# Patient Record
Sex: FEMALE | Race: WHITE | NOT HISPANIC OR LATINO | Employment: STUDENT | URBAN - METROPOLITAN AREA
[De-identification: names, ages, dates, MRNs, and addresses within clinical notes are randomized per-mention and may not be internally consistent; named-entity substitution may affect disease eponyms.]

---

## 2018-09-27 ENCOUNTER — HOSPITAL ENCOUNTER (EMERGENCY)
Facility: CLINIC | Age: 18
Discharge: HOME | End: 2018-09-27
Attending: EMERGENCY MEDICINE | Admitting: EMERGENCY MEDICINE
Payer: COMMERCIAL

## 2018-09-27 ENCOUNTER — TRANSFERRED RECORDS (OUTPATIENT)
Dept: HEALTH INFORMATION MANAGEMENT | Facility: CLINIC | Age: 18
End: 2018-09-27

## 2018-09-27 VITALS
TEMPERATURE: 99.3 F | RESPIRATION RATE: 18 BRPM | BODY MASS INDEX: 23.49 KG/M2 | WEIGHT: 155 LBS | HEART RATE: 104 BPM | OXYGEN SATURATION: 97 % | DIASTOLIC BLOOD PRESSURE: 83 MMHG | SYSTOLIC BLOOD PRESSURE: 120 MMHG | HEIGHT: 68 IN

## 2018-09-27 DIAGNOSIS — J06.9 VIRAL UPPER RESPIRATORY ILLNESS: ICD-10-CM

## 2018-09-27 PROCEDURE — 99281 EMR DPT VST MAYX REQ PHY/QHP: CPT

## 2018-09-27 PROCEDURE — 99282 EMERGENCY DEPT VISIT SF MDM: CPT | Mod: Z6 | Performed by: EMERGENCY MEDICINE

## 2018-09-27 PROCEDURE — 25000125 ZZHC RX 250: Performed by: EMERGENCY MEDICINE

## 2018-09-27 RX ORDER — NORETHINDRONE ACETATE AND ETHINYL ESTRADIOL AND FERROUS FUMARATE 5-7-9-7
1 KIT ORAL DAILY
COMMUNITY

## 2018-09-27 RX ORDER — ALBUTEROL SULFATE 0.83 MG/ML
2.5 SOLUTION RESPIRATORY (INHALATION)
Status: DISCONTINUED | OUTPATIENT
Start: 2018-09-27 | End: 2018-09-27 | Stop reason: HOSPADM

## 2018-09-27 RX ADMIN — ALBUTEROL SULFATE 2.5 MG: 2.5 SOLUTION RESPIRATORY (INHALATION) at 12:51

## 2018-09-27 ASSESSMENT — ENCOUNTER SYMPTOMS
SHORTNESS OF BREATH: 1
COUGH: 1
SORE THROAT: 1
VOICE CHANGE: 1
FEVER: 0

## 2018-09-27 NOTE — ED NOTES
Bed: IN04  Expected date: 9/27/18  Expected time: 11:29 AM  Means of arrival:   Comments:  Mallory Ahrweiler: Coming from [place] with complaints of stridor.  Patient being sent for possible racemic epi.  Has received solumedrol.

## 2018-09-27 NOTE — ED PROVIDER NOTES
"  History     Chief Complaint   Patient presents with     Shortness of Breath     HPI  Natasha Ahrweiler is a 17 year old otherwise healthy female who presents for evaluation of shortness of breath. Patient complains over the past 4-5 days she has had cold-type symptoms including sore throat, loss of voice, headache, sinus congestion, and chest congestion. She presented to [facility] for evaluation today because she had shortness of breath and difficulty breathing, worse when walking to class. She was evaluated at [facility] and was treated with IM steroids and referred here to the ED for further evaluation and management.     I have reviewed the Medications, Allergies, Past Medical and Surgical History, and Social History in the [facility] system.  History reviewed. No pertinent past medical history.    History reviewed. No pertinent surgical history.    No family history on file.    Social History   Substance Use Topics     Smoking status: Never Smoker     Smokeless tobacco: Never Used     Alcohol use No       No current facility-administered medications for this encounter.      Current Outpatient Prescriptions   Medication     norethindrone-ethinyl estradiol-iron (ESTROSTEP FE) 1-20/1-30/1-35 MG-MCG per tablet        Allergies   Allergen Reactions     Penicillins Hives       Review of Systems   Constitutional: Negative for fever.   HENT: Positive for congestion, sore throat and voice change.    Respiratory: Positive for cough and shortness of breath.    All other systems reviewed and are negative.      Physical Exam   BP: (!) 137/91  Pulse: 104  Heart Rate: 99  Temp: 98.9  F (37.2  C)  Resp: 18  Height: 172.7 cm (5' 8\")  Weight: 70.3 kg (155 lb)  SpO2: 100 %      Physical Exam   Constitutional: She is oriented to person, place, and time. She appears well-developed and well-nourished. No distress.   HENT:   Mouth/Throat: Oropharynx is clear and moist.   Neck: Neck supple.   Cardiovascular: Normal rate, regular " rhythm and normal heart sounds.    Pulmonary/Chest: Effort normal and breath sounds normal. No stridor. She has no wheezes.   Neurological: She is alert and oriented to person, place, and time.   Skin: Skin is warm.   Psychiatric: She has a normal mood and affect. Her behavior is normal.   Nursing note and vitals reviewed.      ED Course     ED Course     Procedures       12:39 PM  The patient was seen and examined by Dr. De La Fuente in Room 4.     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  There she was given IM Solu-Medrol and referred here for concerns of airway.  Here she is afebrile, her lungs are clear, oxygen is 100%, she has no wheezing or stridor. I believe she is safe to be discharged home with diagnosis of viral syndrome.  Her oropharynx is clear without swelling.    I have reviewed the nursing notes.    I have reviewed the findings, diagnosis, plan and need for follow up with the patient.    Discharge Medication List as of 9/27/2018  1:52 PM          Final diagnoses:   Viral upper respiratory illness   I, Penny Echols, am serving as a trained medical scribe to document services personally performed by Davy De La Fuente MD, based on the provider's statements to me.   IDavy MD, was physically present and have reviewed and verified the accuracy of this note documented by Penny Echols.      9/27/2018   [facility], EMERGENCY DEPARTMENT     Umair De La Fuente MD  10/01/18 0751

## 2018-09-27 NOTE — ED AVS SNAPSHOT
[facility] [place], Emergency Department    [address] [place] [address]    Phone:  [phone]                                       Natasha Ahrweiler   MRN: [id number]    Department:  [facility], Emergency Department   Date of Visit:  9/27/2018           After Visit Summary Signature Page     I have received my discharge instructions, and my questions have been answered. I have discussed any challenges I see with this plan with the nurse or doctor.    ..........................................................................................................................................  Patient/Patient Representative Signature      ..........................................................................................................................................  Patient Representative Print Name and Relationship to Patient    ..................................................               ................................................  Date                                   Time    ..........................................................................................................................................  Reviewed by Signature/Title    ...................................................              ..............................................  Date                                               Time          22EPIC Rev 08/18

## 2018-09-27 NOTE — DISCHARGE INSTRUCTIONS
The steroids you were given in clinic will start working soon and improve your current symptoms.  You have a viral infection responsible for your symptoms and antibiotics are not indicated  Return if you have any problems or concerns

## 2018-09-27 NOTE — ED AVS SNAPSHOT
[facility], Emergency Department    [address] [address]    Phone:  [phone]                                       Natasha Ahrweiler   MRN: [id number]    Department:  [facility], Emergency Department   Date of Visit:  9/27/2018           Patient Information     Date Of Birth          2000        Your diagnoses for this visit were:     Viral upper respiratory illness        You were seen by Umair De La Fuente MD.        Discharge Instructions       The steroids you were given in clinic will start working soon and improve your current symptoms.  You have a viral infection responsible for your symptoms and antibiotics are not indicated  Return if you have any problems or concerns    24 Hour Appointment Hotline       To make an appointment at any [facility], call [phone] ([phone]). If you don't have a family doctor or clinic, we will help you find one. [place] clinics are conveniently located to serve the needs of you and your family.             Review of your medicines      Our records show that you are taking the medicines listed below. If these are incorrect, please call your family doctor or clinic.        Dose / Directions Last dose taken    norethindrone-ethinyl estradiol-iron 1-20/1-30/1-35 MG-MCG per tablet   Commonly known as:  ESTROSTEP FE   Dose:  1 tablet        Take 1 tablet by mouth daily   Refills:  0                Orders Needing Specimen Collection     None      Pending Results     No orders found from 9/25/2018 to 9/28/2018.            Pending Culture Results     No orders found from 9/25/2018 to 9/28/2018.            Pending Results Instructions     If you had any lab results that were not finalized at the time of your Discharge, you can call the ED Lab Result RN at [phone]. You will be contacted by this team for any positive Lab results or changes in treatment. The nurses are available 7 days a week from 10A to 6:30P.  You can leave  "a message 24 hours per day and they will return your call.        Thank you for choosing [facility]       Thank you for choosing [facility] for your care. Our goal is always to provide you with excellent care. Hearing back from our patients is one way we can continue to improve our services. Please take a few minutes to complete the written survey that you may receive in the mail after you visit with us. Thank you!        [facility] Information     [employer] lets you send messages to your doctor, view your test results, renew your prescriptions, schedule appointments and more. To sign up, go to [email or web address] . Click on \"Log in\" on the left side of the screen, which will take you to the Welcome page. Then click on \"Sign up Now\" on the right side of the page.     You will be asked to enter the access code listed below, as well as some personal information. Please follow the directions to create your username and password.     Your access code is: [email or web address]  Expires: 2018  1:52 PM     Your access code will  in 90 days. If you need help or a new code, please call your [facility] clinic or [phone].        Care EveryWhere ID     This is your Care EveryWhere ID. This could be used by other organizations to access your [facility] medical records  [id number]        Equal Access to Services     JALEN JAMES AH: Don oliviero Sonedra, waaxda luqadaha, qaybta kaalmada adekelsey, daisy carter. So [facility] [phone].    ATENCIÓN: Si habla español, tiene a palacios disposición servicios gratuitos de asistencia lingüística. Llame al [phone].    We comply with applicable federal civil rights laws and [place] laws. We do not discriminate on the basis of race, color, national origin, age, disability, sex, sexual orientation, or gender identity.            After Visit Summary       This is your record. Keep this with you and show to your community pharmacist(s) and doctor(s) at your " next visit.

## 2022-05-23 ENCOUNTER — ANCILLARY PROCEDURE (OUTPATIENT)
Dept: MRI IMAGING | Facility: CLINIC | Age: 22
End: 2022-05-23
Attending: INTERNAL MEDICINE
Payer: COMMERCIAL

## 2022-05-23 DIAGNOSIS — R19.4 CHANGE IN BOWEL HABITS: ICD-10-CM

## 2022-05-23 DIAGNOSIS — R10.30 LOWER ABDOMINAL PAIN: ICD-10-CM

## 2022-05-23 DIAGNOSIS — R19.7 DIARRHEA, UNSPECIFIED TYPE: ICD-10-CM

## 2022-05-23 PROCEDURE — A9585 GADOBUTROL INJECTION: HCPCS | Mod: JW | Performed by: RADIOLOGY

## 2022-05-23 PROCEDURE — 72197 MRI PELVIS W/O & W/DYE: CPT | Mod: GC | Performed by: RADIOLOGY

## 2022-05-23 PROCEDURE — 74183 MRI ABD W/O CNTR FLWD CNTR: CPT | Mod: GC | Performed by: RADIOLOGY

## 2022-05-23 RX ORDER — GADOBUTROL 604.72 MG/ML
7.5 INJECTION INTRAVENOUS ONCE
Status: COMPLETED | OUTPATIENT
Start: 2022-05-23 | End: 2022-05-23

## 2022-05-23 RX ADMIN — GADOBUTROL 7 ML: 604.72 INJECTION INTRAVENOUS at 14:27
